# Patient Record
Sex: MALE | Race: WHITE
[De-identification: names, ages, dates, MRNs, and addresses within clinical notes are randomized per-mention and may not be internally consistent; named-entity substitution may affect disease eponyms.]

---

## 2022-07-06 ENCOUNTER — HOSPITAL ENCOUNTER (EMERGENCY)
Dept: HOSPITAL 54 - ER | Age: 57
Discharge: HOME | End: 2022-07-06
Payer: COMMERCIAL

## 2022-07-06 VITALS — WEIGHT: 182 LBS | BODY MASS INDEX: 24.65 KG/M2 | HEIGHT: 72 IN

## 2022-07-06 VITALS — SYSTOLIC BLOOD PRESSURE: 142 MMHG | DIASTOLIC BLOOD PRESSURE: 98 MMHG
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
## 2022-07-06 DIAGNOSIS — S01.01XA: Primary | ICD-10-CM

## 2022-07-06 DIAGNOSIS — I10: ICD-10-CM
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
## 2022-07-06 PROCEDURE — 12001 RPR S/N/AX/GEN/TRNK 2.5CM/<: CPT

## 2022-07-06 PROCEDURE — 99284 EMERGENCY DEPT VISIT MOD MDM: CPT

## 2022-07-06 PROCEDURE — 90471 IMMUNIZATION ADMIN: CPT

## 2022-07-06 PROCEDURE — 70450 CT HEAD/BRAIN W/O DYE: CPT

## 2022-07-06 PROCEDURE — 90715 TDAP VACCINE 7 YRS/> IM: CPT

## 2022-07-06 PROCEDURE — A6403 STERILE GAUZE>16 <= 48 SQ IN: HCPCS

## 2022-07-06 RX ADMIN — CLOSTRIDIUM TETANI TOXOID ANTIGEN (FORMALDEHYDE INACTIVATED), CORYNEBACTERIUM DIPHTHERIAE TOXOID ANTIGEN (FORMALDEHYDE INACTIVATED), BORDETELLA PERTUSSIS TOXOID ANTIGEN (GLUTARALDEHYDE INACTIVATED), BORDETELLA PERTUSSIS FILAMENTOUS HEMAGGLUTININ ANTIGEN (FORMALDEHYDE INACTIVATED), BORDETELLA PERTUSSIS PERTACTIN ANTIGEN, AND BORDETELLA PERTUSSIS FIMBRIAE 2/3 ANTIGEN ONE ML: 5; 2; 2.5; 5; 3; 5 INJECTION, SUSPENSION INTRAMUSCULAR at 13:48

## 2022-07-06 NOTE — NUR
BIB WIFE S/P LACERATION ON THE HEAD. PT TDAP IS NOT UP TO DATE PER PT. A&OX4, 
DENIES LOSING CONSCIOUSNESS. DR RAMOS AT BEDSIDE.